# Patient Record
Sex: FEMALE | HISPANIC OR LATINO | Employment: UNEMPLOYED | URBAN - METROPOLITAN AREA
[De-identification: names, ages, dates, MRNs, and addresses within clinical notes are randomized per-mention and may not be internally consistent; named-entity substitution may affect disease eponyms.]

---

## 2017-07-11 RX ORDER — LEVOTHYROXINE SODIUM 0.05 MG/1
TABLET ORAL
Qty: 90 TAB | Refills: 0 | Status: SHIPPED | OUTPATIENT
Start: 2017-07-11 | End: 2017-11-07 | Stop reason: SDUPTHER

## 2017-11-03 ENCOUNTER — HOSPITAL ENCOUNTER (OUTPATIENT)
Dept: LAB | Facility: MEDICAL CENTER | Age: 67
End: 2017-11-03
Attending: INTERNAL MEDICINE
Payer: COMMERCIAL

## 2017-11-03 LAB
CHOLEST SERPL-MCNC: 192 MG/DL (ref 100–199)
HDLC SERPL-MCNC: 38 MG/DL
LDLC SERPL CALC-MCNC: 131 MG/DL
TRIGL SERPL-MCNC: 117 MG/DL (ref 0–149)

## 2017-11-03 PROCEDURE — 36415 COLL VENOUS BLD VENIPUNCTURE: CPT

## 2017-11-03 PROCEDURE — 80061 LIPID PANEL: CPT

## 2017-11-03 PROCEDURE — 86341 ISLET CELL ANTIBODY: CPT

## 2017-11-03 PROCEDURE — 83516 IMMUNOASSAY NONANTIBODY: CPT

## 2017-11-03 PROCEDURE — 84681 ASSAY OF C-PEPTIDE: CPT

## 2017-11-05 LAB
C PEPTIDE SERPL-MCNC: 1.8 NG/ML (ref 0.8–3.5)
PANC ISLET CELL AB TITR SER: NORMAL {TITER}

## 2017-11-07 ENCOUNTER — OFFICE VISIT (OUTPATIENT)
Dept: MEDICAL GROUP | Facility: PHYSICIAN GROUP | Age: 67
End: 2017-11-07
Payer: COMMERCIAL

## 2017-11-07 VITALS
BODY MASS INDEX: 36.52 KG/M2 | HEIGHT: 60 IN | DIASTOLIC BLOOD PRESSURE: 60 MMHG | WEIGHT: 186 LBS | HEART RATE: 73 BPM | SYSTOLIC BLOOD PRESSURE: 130 MMHG | OXYGEN SATURATION: 93 % | RESPIRATION RATE: 14 BRPM | TEMPERATURE: 98 F

## 2017-11-07 DIAGNOSIS — E66.9 OBESITY (BMI 30-39.9): ICD-10-CM

## 2017-11-07 DIAGNOSIS — E03.4 HYPOTHYROIDISM DUE TO ACQUIRED ATROPHY OF THYROID: ICD-10-CM

## 2017-11-07 LAB — GAD65 AB SER IA-ACNC: <5 IU/ML (ref 0–5)

## 2017-11-07 PROCEDURE — 99214 OFFICE O/P EST MOD 30 MIN: CPT | Performed by: FAMILY MEDICINE

## 2017-11-07 RX ORDER — LEVOTHYROXINE SODIUM 0.05 MG/1
TABLET ORAL
Qty: 90 TAB | Refills: 1 | Status: SHIPPED | OUTPATIENT
Start: 2017-11-07 | End: 2018-07-16 | Stop reason: SDUPTHER

## 2017-11-07 RX ORDER — LEVOTHYROXINE SODIUM 0.05 MG/1
TABLET ORAL
Qty: 90 TAB | Refills: 1 | Status: SHIPPED | OUTPATIENT
Start: 2017-11-07 | End: 2017-11-07 | Stop reason: SDUPTHER

## 2017-11-07 RX ORDER — LISINOPRIL 10 MG/1
10 TABLET ORAL DAILY
COMMUNITY
End: 2022-04-19

## 2017-11-07 ASSESSMENT — ENCOUNTER SYMPTOMS
CONSTITUTIONAL NEGATIVE: 1
PALPITATIONS: 0
GASTROINTESTINAL NEGATIVE: 1
EYES NEGATIVE: 1
HEADACHES: 0
CHILLS: 0
MUSCULOSKELETAL NEGATIVE: 1
DIZZINESS: 0
CARDIOVASCULAR NEGATIVE: 1
CONSTIPATION: 0
RESPIRATORY NEGATIVE: 1
NEUROLOGICAL NEGATIVE: 1
FEVER: 0
PSYCHIATRIC NEGATIVE: 1
NECK PAIN: 0
COUGH: 0
HEMOPTYSIS: 0
MYALGIAS: 0

## 2017-11-07 ASSESSMENT — PATIENT HEALTH QUESTIONNAIRE - PHQ9: CLINICAL INTERPRETATION OF PHQ2 SCORE: 0

## 2017-11-07 NOTE — PROGRESS NOTES
Subjective:      Marilou Funes is a 67 y.o. female who presents with Elevated Glucose            1. IDDM (insulin dependent diabetes mellitus) (CMS-[facility])  Normal bs last year but last month found to have increased thirst and went to er and bs of 700 and a1c of 9.8  Neg labs for anti islet cells so not type I  , bs today much better at 230  On low doses of nph and regular insulin  Will have her write down bs tid for 6 weeks and then bring them in so we can adjust insulin dose    2. Obesity (BMI 30-39.9)    - lisinopril (PRINIVIL) 10 MG Tab; Take 10 mg by mouth every day.  - Patient identified as having weight management issue.  Appropriate orders and counseling given.    3. Hypothyroidism due to acquired atrophy of thyroid  Patient is being treated for hypothyroidism, currently taking meds, no symptoms of fast or slow heartbeat and energy level steady. No new hair loss or skin symptoms. Labs have been checked in past year and are stable on current dose.  controlled      Past Medical History:  No date: Hypothyroid  History reviewed. No pertinent surgical history.  Smoking status: Never Smoker                                                              Smokeless tobacco: Never Used                      Alcohol use: No              History reviewed.  No pertinent family history.      Current Outpatient Prescriptions: •  lisinopril (PRINIVIL) 10 MG Tab, Take 10 mg by mouth every day., Disp: , Rfl:  •  levothyroxine (SYNTHROID) 50 MCG Tab, TAKE ONE TABLET BY MOUTH IN THE MORNING ON AN EMPTY STOMACH, Disp: 90 Tab, Rfl: 1•  cholecalciferol ([employer] VIT D 5000 HIGH-POTENCY) 5000 UNIT CAPS, Take 1 Cap by mouth every day., Disp: 30 Cap, Rfl: 3•  etodolac (LODINE) 400 MG tablet, Take 1 Tab by mouth 2 times a day. (Patient not taking: Reported on 11/7/2017), Disp: 60 Tab, Rfl: 3•  levothyroxine (SYNTHROID) 25 MCG TABS, Take 1 Tab by mouth Every morning on an empty stomach. (Patient not taking: Reported on  11/7/2017), Disp: 30 Tab, Rfl: 3•  Levothyroxine Sodium (SYNTHROID PO), Take  by mouth. Indications: Pt unsure of dosage, Disp: , Rfl: •  ondansetron (ZOFRAN ODT) 4 MG TBDP, Take 1 Tab by mouth every 8 hours as needed for Nausea/Vomiting. (Patient not taking: Reported on 11/7/2017), Disp: 10 Tab, Rfl: 0    Patient was instructed on the use of medications, either prescriptions or OTC and informed on when the appropriate follow up time period should be. In addition, patient was also instructed that should any acute worsening occur that they should notify this clinic asap or call 911.            Review of Systems   Constitutional: Negative.  Negative for chills and fever.        Past Medical History:  No date: Hypothyroid  History reviewed. No pertinent surgical history.  Smoking status: Never Smoker                                                              Smokeless tobacco: Never Used                      Alcohol use: No              History reviewed.  No pertinent family history.     HENT: Negative.    Eyes: Negative.    Respiratory: Negative.  Negative for cough and hemoptysis.    Cardiovascular: Negative.  Negative for chest pain and palpitations.   Gastrointestinal: Negative.  Negative for constipation.   Genitourinary: Negative.  Negative for dysuria and urgency.   Musculoskeletal: Negative.  Negative for myalgias and neck pain.   Skin: Negative.  Negative for rash.   Neurological: Negative.  Negative for dizziness and headaches.   Endo/Heme/Allergies: Negative.    Psychiatric/Behavioral: Negative.  Negative for suicidal ideas.          Objective:     /60   Pulse 73   Temp 36.7 °C (98 °F)   Resp 14   Ht 1.524 m (5')   Wt 84.4 kg (186 lb)   SpO2 93%   BMI 36.33 kg/m²      Physical Exam   Constitutional: She is oriented to person, place, and time. She appears well-developed and well-nourished. No distress.   HENT:   Head: Normocephalic and atraumatic.   Mouth/Throat: Oropharynx is clear and moist.  No oropharyngeal exudate.   Eyes: Pupils are equal, round, and reactive to light.   Cardiovascular: Normal rate, regular rhythm, normal heart sounds and intact distal pulses.  Exam reveals no gallop and no friction rub.    No murmur heard.  Pulmonary/Chest: Effort normal and breath sounds normal. No respiratory distress. She has no wheezes. She has no rales. She exhibits no tenderness.   Neurological: She is alert and oriented to person, place, and time.   Skin: She is not diaphoretic.   Psychiatric: She has a normal mood and affect. Her behavior is normal. Judgment and thought content normal.   Nursing note and vitals reviewed.              Assessment/Plan:     1. IDDM (insulin dependent diabetes mellitus) (CMS-[facility])      2. Obesity (BMI 30-39.9)    - lisinopril (PRINIVIL) 10 MG Tab; Take 10 mg by mouth every day.  - Patient identified as having weight management issue.  Appropriate orders and counseling given.    3. Hypothyroidism due to acquired atrophy of thyroid

## 2017-12-19 ENCOUNTER — OFFICE VISIT (OUTPATIENT)
Dept: MEDICAL GROUP | Facility: PHYSICIAN GROUP | Age: 67
End: 2017-12-19
Payer: COMMERCIAL

## 2017-12-19 VITALS
WEIGHT: 187 LBS | RESPIRATION RATE: 16 BRPM | BODY MASS INDEX: 36.71 KG/M2 | HEART RATE: 75 BPM | HEIGHT: 60 IN | DIASTOLIC BLOOD PRESSURE: 88 MMHG | OXYGEN SATURATION: 96 % | SYSTOLIC BLOOD PRESSURE: 138 MMHG | TEMPERATURE: 97.9 F

## 2017-12-19 DIAGNOSIS — Z12.31 ENCOUNTER FOR SCREENING MAMMOGRAM FOR HIGH-RISK PATIENT: ICD-10-CM

## 2017-12-19 DIAGNOSIS — E03.4 HYPOTHYROIDISM DUE TO ACQUIRED ATROPHY OF THYROID: ICD-10-CM

## 2017-12-19 DIAGNOSIS — E55.9 VITAMIN D DEFICIENCY: ICD-10-CM

## 2017-12-19 PROCEDURE — 99214 OFFICE O/P EST MOD 30 MIN: CPT | Performed by: FAMILY MEDICINE

## 2017-12-19 ASSESSMENT — ENCOUNTER SYMPTOMS
DIZZINESS: 0
NEUROLOGICAL NEGATIVE: 1
GASTROINTESTINAL NEGATIVE: 1
MUSCULOSKELETAL NEGATIVE: 1
COUGH: 0
FEVER: 0
HEADACHES: 0
RESPIRATORY NEGATIVE: 1
EYES NEGATIVE: 1
CARDIOVASCULAR NEGATIVE: 1
CONSTITUTIONAL NEGATIVE: 1
PSYCHIATRIC NEGATIVE: 1
CONSTIPATION: 0
MYALGIAS: 0
PALPITATIONS: 0
NECK PAIN: 0
HEMOPTYSIS: 0
CHILLS: 0

## 2017-12-19 NOTE — PROGRESS NOTES
Subjective:      Marilou Funes is a 67 y.o. female who presents with Blood Sugar Problem (F/V)            1. IDDM (insulin dependent diabetes mellitus) (CMS-HCC)  a1c of 7.2  Taking 10 units of lantus qhs and 5 units tid of regular  Having some sx of lows in the mornings  Will decrease down to 5 units of lantus and then f/u in 3 months with full labs  - COMP METABOLIC PANEL; Future  - HEMOGLOBIN A1C; Future  - MICROALBUMIN CREAT RATIO URINE (LAB COLLECT); Future    2. Vitamin D deficiency  Patient is currently treated for vitamin D deficiency with supplemental vitamin d, increase sunlight and dairy products. No current issues with treatment  controlled      3. Hypothyroidism due to acquired atrophy of thyroid  Patient is being treated for hypothyroidism, currently taking meds, no symptoms of fast or slow heartbeat and energy level steady. No new hair loss or skin symptoms. Labs have been checked in past year and are stable on current dose.  controlled      4. Encounter for screening mammogram for high-risk patient    - MA-SCREEN MAMMO W/CAD-BILAT; Future    Past Medical History:  No date: Hypothyroid  History reviewed. No pertinent surgical history.  Smoking status: Never Smoker                                                              Smokeless tobacco: Never Used                      Alcohol use: No              History reviewed.  No pertinent family history.      Current Outpatient Prescriptions: •  lisinopril (PRINIVIL) 10 MG Tab, Take 10 mg by mouth every day., Disp: , Rfl:  •  levothyroxine (SYNTHROID) 50 MCG Tab, TAKE ONE TABLET BY MOUTH IN THE MORNING ON AN EMPTY STOMACH, Disp: 90 Tab, Rfl: 1•  cholecalciferol ([employer]S VIT D 5000 HIGH-POTENCY) 5000 UNIT CAPS, Take 1 Cap by mouth every day., Disp: 30 Cap, Rfl: 3•  etodolac (LODINE) 400 MG tablet, Take 1 Tab by mouth 2 times a day., Disp: 60 Tab, Rfl: 3•  levothyroxine (SYNTHROID) 25 MCG TABS, Take 1 Tab by mouth Every morning on an empty  stomach., Disp: 30 Tab, Rfl: 3•  Levothyroxine Sodium (SYNTHROID PO), Take  by mouth. Indications: Pt unsure of dosage, Disp: , Rfl: •  ondansetron (ZOFRAN ODT) 4 MG TBDP, Take 1 Tab by mouth every 8 hours as needed for Nausea/Vomiting., Disp: 10 Tab, Rfl: 0    Patient was instructed on the use of medications, either prescriptions or OTC and informed on when the appropriate follow up time period should be. In addition, patient was also instructed that should any acute worsening occur that they should notify this clinic asap or call 911.            Review of Systems   Constitutional: Negative.  Negative for chills and fever.        Past Medical History:  No date: Hypothyroid  History reviewed. No pertinent surgical history.  Smoking status: Never Smoker                                                              Smokeless tobacco: Never Used                      Alcohol use: No              History reviewed.  No pertinent family history.     HENT: Negative.    Eyes: Negative.    Respiratory: Negative.  Negative for cough and hemoptysis.    Cardiovascular: Negative.  Negative for chest pain and palpitations.   Gastrointestinal: Negative.  Negative for constipation.   Genitourinary: Negative.  Negative for dysuria and urgency.   Musculoskeletal: Negative.  Negative for myalgias and neck pain.   Skin: Negative.  Negative for rash.   Neurological: Negative.  Negative for dizziness and headaches.   Endo/Heme/Allergies: Negative.    Psychiatric/Behavioral: Negative.  Negative for suicidal ideas.          Objective:     /88   Pulse 75   Temp 36.6 °C (97.9 °F)   Resp 16   Ht 1.524 m (5')   Wt 84.8 kg (187 lb)   SpO2 96%   BMI 36.52 kg/m²      Physical Exam   Constitutional: She is oriented to person, place, and time. She appears well-developed and well-nourished. No distress.   HENT:   Head: Normocephalic and atraumatic.   Mouth/Throat: Oropharynx is clear and moist. No oropharyngeal exudate.   Eyes: Pupils are  equal, round, and reactive to light.   Cardiovascular: Normal rate, regular rhythm, normal heart sounds and intact distal pulses.  Exam reveals no gallop and no friction rub.    No murmur heard.  Pulmonary/Chest: Effort normal and breath sounds normal. No respiratory distress. She has no wheezes. She has no rales. She exhibits no tenderness.   Neurological: She is alert and oriented to person, place, and time.   Skin: She is not diaphoretic.   Psychiatric: She has a normal mood and affect. Her behavior is normal. Judgment and thought content normal.   Nursing note and vitals reviewed.              Assessment/Plan:     1. IDDM (insulin dependent diabetes mellitus) (CMS-[facility])    - COMP METABOLIC PANEL; Future  - HEMOGLOBIN A1C; Future  - MICROALBUMIN CREAT RATIO URINE (LAB COLLECT); Future    2. Vitamin D deficiency      3. Hypothyroidism due to acquired atrophy of thyroid      4. Encounter for screening mammogram for high-risk patient    - MA-SCREEN MAMMO W/CAD-BILAT; Future

## 2018-06-14 DIAGNOSIS — E03.9 ACQUIRED HYPOTHYROIDISM: ICD-10-CM

## 2018-06-18 RX ORDER — LEVOTHYROXINE SODIUM 0.05 MG/1
TABLET ORAL
Qty: 90 TAB | Refills: 1 | Status: SHIPPED | OUTPATIENT
Start: 2018-06-18 | End: 2019-07-30 | Stop reason: SDUPTHER

## 2018-07-16 RX ORDER — LEVOTHYROXINE SODIUM 0.05 MG/1
TABLET ORAL
Qty: 90 TAB | Refills: 1 | Status: SHIPPED | OUTPATIENT
Start: 2018-07-16 | End: 2019-03-22 | Stop reason: SDUPTHER

## 2019-03-21 ENCOUNTER — TELEPHONE (OUTPATIENT)
Dept: MEDICAL GROUP | Facility: PHYSICIAN GROUP | Age: 69
End: 2019-03-21

## 2019-03-21 NOTE — TELEPHONE ENCOUNTER
Patients daughter calloed in requesting a refill of her levothyroxine. Looking at her chart, it has been over a year since she has been in for a follow up. She was advised to schedule an appointment and to call back after so a request for a short supply can be requested until she is seen.

## 2019-03-22 ENCOUNTER — OFFICE VISIT (OUTPATIENT)
Dept: MEDICAL GROUP | Facility: PHYSICIAN GROUP | Age: 69
End: 2019-03-22
Payer: COMMERCIAL

## 2019-03-22 VITALS
DIASTOLIC BLOOD PRESSURE: 78 MMHG | WEIGHT: 194 LBS | BODY MASS INDEX: 35.7 KG/M2 | OXYGEN SATURATION: 94 % | RESPIRATION RATE: 12 BRPM | HEART RATE: 61 BPM | SYSTOLIC BLOOD PRESSURE: 144 MMHG | TEMPERATURE: 97.5 F | HEIGHT: 62 IN

## 2019-03-22 DIAGNOSIS — E03.9 HYPOTHYROIDISM, UNSPECIFIED TYPE: ICD-10-CM

## 2019-03-22 DIAGNOSIS — E66.9 OBESITY (BMI 35.0-39.9 WITHOUT COMORBIDITY): ICD-10-CM

## 2019-03-22 LAB
HBA1C MFR BLD: 6 % (ref ?–5.8)
INT CON NEG: NEGATIVE
INT CON POS: POSITIVE

## 2019-03-22 PROCEDURE — 99214 OFFICE O/P EST MOD 30 MIN: CPT | Performed by: FAMILY MEDICINE

## 2019-03-22 PROCEDURE — 83036 HEMOGLOBIN GLYCOSYLATED A1C: CPT | Performed by: FAMILY MEDICINE

## 2019-03-22 RX ORDER — LEVOTHYROXINE SODIUM 0.05 MG/1
TABLET ORAL
Qty: 90 TAB | Refills: 1 | Status: SHIPPED | OUTPATIENT
Start: 2019-03-22 | End: 2019-07-26 | Stop reason: SDUPTHER

## 2019-03-22 ASSESSMENT — ENCOUNTER SYMPTOMS
MYALGIAS: 0
BLURRED VISION: 0
NAUSEA: 0
HEMOPTYSIS: 0
EYES NEGATIVE: 1
CHILLS: 0
TINGLING: 0
COUGH: 0
PALPITATIONS: 0
GASTROINTESTINAL NEGATIVE: 1
HEADACHES: 0
DIZZINESS: 0
DOUBLE VISION: 0
DEPRESSION: 0
PSYCHIATRIC NEGATIVE: 1
BRUISES/BLEEDS EASILY: 0
MUSCULOSKELETAL NEGATIVE: 1
CARDIOVASCULAR NEGATIVE: 1
RESPIRATORY NEGATIVE: 1
HEARTBURN: 0
CONSTITUTIONAL NEGATIVE: 1
NEUROLOGICAL NEGATIVE: 1
FEVER: 0

## 2019-07-19 ENCOUNTER — HOSPITAL ENCOUNTER (OUTPATIENT)
Dept: LAB | Facility: MEDICAL CENTER | Age: 69
End: 2019-07-19
Attending: FAMILY MEDICINE
Payer: COMMERCIAL

## 2019-07-19 DIAGNOSIS — E03.9 HYPOTHYROIDISM, UNSPECIFIED TYPE: ICD-10-CM

## 2019-07-19 DIAGNOSIS — E66.9 OBESITY (BMI 35.0-39.9 WITHOUT COMORBIDITY): ICD-10-CM

## 2019-07-19 LAB
ALBUMIN SERPL BCP-MCNC: 4 G/DL (ref 3.2–4.9)
ALBUMIN/GLOB SERPL: 1.1 G/DL
ALP SERPL-CCNC: 82 U/L (ref 30–99)
ALT SERPL-CCNC: 88 U/L (ref 2–50)
ANION GAP SERPL CALC-SCNC: 8 MMOL/L (ref 0–11.9)
AST SERPL-CCNC: 49 U/L (ref 12–45)
BILIRUB SERPL-MCNC: 0.4 MG/DL (ref 0.1–1.5)
BUN SERPL-MCNC: 10 MG/DL (ref 8–22)
CALCIUM SERPL-MCNC: 9.2 MG/DL (ref 8.5–10.5)
CHLORIDE SERPL-SCNC: 105 MMOL/L (ref 96–112)
CHOLEST SERPL-MCNC: 230 MG/DL (ref 100–199)
CO2 SERPL-SCNC: 27 MMOL/L (ref 20–33)
CREAT SERPL-MCNC: 0.66 MG/DL (ref 0.5–1.4)
GLOBULIN SER CALC-MCNC: 3.5 G/DL (ref 1.9–3.5)
GLUCOSE SERPL-MCNC: 114 MG/DL (ref 65–99)
HDLC SERPL-MCNC: 41 MG/DL
LDLC SERPL CALC-MCNC: 159 MG/DL
POTASSIUM SERPL-SCNC: 4.3 MMOL/L (ref 3.6–5.5)
PROT SERPL-MCNC: 7.5 G/DL (ref 6–8.2)
SODIUM SERPL-SCNC: 140 MMOL/L (ref 135–145)
T3 SERPL-MCNC: 120.9 NG/DL (ref 60–181)
T4 FREE SERPL-MCNC: 0.7 NG/DL (ref 0.53–1.43)
TRIGL SERPL-MCNC: 152 MG/DL (ref 0–149)
TSH SERPL DL<=0.005 MIU/L-ACNC: 7.34 UIU/ML (ref 0.38–5.33)

## 2019-07-19 PROCEDURE — 82043 UR ALBUMIN QUANTITATIVE: CPT

## 2019-07-19 PROCEDURE — 84480 ASSAY TRIIODOTHYRONINE (T3): CPT

## 2019-07-19 PROCEDURE — 84443 ASSAY THYROID STIM HORMONE: CPT

## 2019-07-19 PROCEDURE — 80053 COMPREHEN METABOLIC PANEL: CPT

## 2019-07-19 PROCEDURE — 80061 LIPID PANEL: CPT

## 2019-07-19 PROCEDURE — 83036 HEMOGLOBIN GLYCOSYLATED A1C: CPT

## 2019-07-19 PROCEDURE — 84439 ASSAY OF FREE THYROXINE: CPT

## 2019-07-19 PROCEDURE — 36415 COLL VENOUS BLD VENIPUNCTURE: CPT

## 2019-07-19 PROCEDURE — 82570 ASSAY OF URINE CREATININE: CPT

## 2019-07-20 LAB
CREAT UR-MCNC: 64.1 MG/DL
EST. AVERAGE GLUCOSE BLD GHB EST-MCNC: 143 MG/DL
HBA1C MFR BLD: 6.6 % (ref 0–5.6)
MICROALBUMIN UR-MCNC: <0.7 MG/DL
MICROALBUMIN/CREAT UR: NORMAL MG/G (ref 0–30)

## 2019-07-26 DIAGNOSIS — E66.9 OBESITY (BMI 35.0-39.9 WITHOUT COMORBIDITY): ICD-10-CM

## 2019-07-26 DIAGNOSIS — E03.9 HYPOTHYROIDISM, UNSPECIFIED TYPE: ICD-10-CM

## 2019-07-26 RX ORDER — LEVOTHYROXINE SODIUM 0.05 MG/1
TABLET ORAL
Qty: 90 TAB | Refills: 1 | Status: SHIPPED | OUTPATIENT
Start: 2019-07-26 | End: 2020-03-27 | Stop reason: SDUPTHER

## 2019-07-26 NOTE — TELEPHONE ENCOUNTER
Was the patient seen in the last year in this department? Yes    Does patient have an active prescription for medications requested? Yes    Received Request Via: Pharmacy     Last visit  3-22-19    Last lab   7-19-19

## 2019-07-30 ENCOUNTER — OFFICE VISIT (OUTPATIENT)
Dept: MEDICAL GROUP | Facility: PHYSICIAN GROUP | Age: 69
End: 2019-07-30
Payer: COMMERCIAL

## 2019-07-30 VITALS
WEIGHT: 195.4 LBS | BODY MASS INDEX: 35.96 KG/M2 | OXYGEN SATURATION: 94 % | SYSTOLIC BLOOD PRESSURE: 136 MMHG | DIASTOLIC BLOOD PRESSURE: 74 MMHG | HEIGHT: 62 IN | HEART RATE: 70 BPM | TEMPERATURE: 97.6 F | RESPIRATION RATE: 14 BRPM

## 2019-07-30 DIAGNOSIS — E03.9 ACQUIRED HYPOTHYROIDISM: ICD-10-CM

## 2019-07-30 DIAGNOSIS — E66.9 OBESITY (BMI 35.0-39.9 WITHOUT COMORBIDITY): ICD-10-CM

## 2019-07-30 DIAGNOSIS — E74.39 GLUCOSE INTOLERANCE: ICD-10-CM

## 2019-07-30 DIAGNOSIS — E03.4 HYPOTHYROIDISM DUE TO ACQUIRED ATROPHY OF THYROID: ICD-10-CM

## 2019-07-30 DIAGNOSIS — E78.2 MIXED HYPERLIPIDEMIA: ICD-10-CM

## 2019-07-30 PROCEDURE — 99214 OFFICE O/P EST MOD 30 MIN: CPT | Performed by: FAMILY MEDICINE

## 2019-07-30 RX ORDER — LOVASTATIN 10 MG/1
10 TABLET ORAL DAILY
Qty: 90 TAB | Refills: 1 | Status: SHIPPED | OUTPATIENT
Start: 2019-07-30 | End: 2022-04-19

## 2019-07-30 RX ORDER — LEVOTHYROXINE SODIUM 0.05 MG/1
TABLET ORAL
Qty: 90 TAB | Refills: 1 | Status: SHIPPED | OUTPATIENT
Start: 2019-07-30 | End: 2020-06-18 | Stop reason: SDUPTHER

## 2019-07-30 ASSESSMENT — ENCOUNTER SYMPTOMS
TINGLING: 0
EYES NEGATIVE: 1
HEARTBURN: 0
MUSCULOSKELETAL NEGATIVE: 1
MYALGIAS: 0
BRUISES/BLEEDS EASILY: 0
FEVER: 0
NAUSEA: 0
HEMOPTYSIS: 0
CHILLS: 0
NEUROLOGICAL NEGATIVE: 1
RESPIRATORY NEGATIVE: 1
BLURRED VISION: 0
DIZZINESS: 0
HEADACHES: 0
COUGH: 0
PSYCHIATRIC NEGATIVE: 1
GASTROINTESTINAL NEGATIVE: 1
DEPRESSION: 0
PALPITATIONS: 0
CARDIOVASCULAR NEGATIVE: 1
CONSTITUTIONAL NEGATIVE: 1
DOUBLE VISION: 0

## 2019-07-31 NOTE — PROGRESS NOTES
Subjective:      Marilou Funes is a 69 y.o. female who presents with Results (lab )            1. Hypothyroidism due to acquired atrophy of thyroid  Patient is being treated for hypothyroidism, currently taking meds, no symptoms of fast or slow heartbeat and energy level steady. No new hair loss or skin symptoms. Labs have been checked in past year and are stable on current dose.  controlled    - levothyroxine (SYNTHROID) 50 MCG Tab; TAKE ONE TABLET BY MOUTH ONCE DAILY IN THE MORNING ON EMPTY STOMACH  Dispense: 90 Tab; Refill: 1  - lovastatin (MEVACOR) 10 MG tablet; Take 1 Tab by mouth every day.  Dispense: 90 Tab; Refill: 1  - Comp Metabolic Panel; Future  - FREE THYROXINE; Future  - TRIIDOTHYRONINE; Future  - TSH; Future  - HEMOGLOBIN A1C; Future  - Lipid Profile; Future    2. Obesity (BMI 35.0-39.9 without comorbidity) (HCC)    - levothyroxine (SYNTHROID) 50 MCG Tab; TAKE ONE TABLET BY MOUTH ONCE DAILY IN THE MORNING ON EMPTY STOMACH  Dispense: 90 Tab; Refill: 1  - lovastatin (MEVACOR) 10 MG tablet; Take 1 Tab by mouth every day.  Dispense: 90 Tab; Refill: 1  - Comp Metabolic Panel; Future  - FREE THYROXINE; Future  - TRIIDOTHYRONINE; Future  - TSH; Future  - HEMOGLOBIN A1C; Future  - Lipid Profile; Future    3. Glucose intolerance  a1c 6.3  On diet only and doing ok will continue  Due to the patient's issues with glucose management, today they were extensively counseled on a low carb diet and exercise and losing weight    - levothyroxine (SYNTHROID) 50 MCG Tab; TAKE ONE TABLET BY MOUTH ONCE DAILY IN THE MORNING ON EMPTY STOMACH  Dispense: 90 Tab; Refill: 1  - lovastatin (MEVACOR) 10 MG tablet; Take 1 Tab by mouth every day.  Dispense: 90 Tab; Refill: 1  - Comp Metabolic Panel; Future  - FREE THYROXINE; Future  - TRIIDOTHYRONINE; Future  - TSH; Future  - HEMOGLOBIN A1C; Future  - Lipid Profile; Future    4. Acquired hypothyroidism  Patient is being treated for hypothyroidism, currently  taking meds, no symptoms of fast or slow heartbeat and energy level steady. No new hair loss or skin symptoms. Labs have been checked in past year and are stable on current dose.  controlled    - levothyroxine (SYNTHROID) 50 MCG Tab; TAKE ONE TABLET BY MOUTH ONCE DAILY IN THE MORNING ON EMPTY STOMACH  Dispense: 90 Tab; Refill: 1  - lovastatin (MEVACOR) 10 MG tablet; Take 1 Tab by mouth every day.  Dispense: 90 Tab; Refill: 1  - Comp Metabolic Panel; Future  - FREE THYROXINE; Future  - TRIIDOTHYRONINE; Future  - TSH; Future  - HEMOGLOBIN A1C; Future  - Lipid Profile; Future    5. Mixed hyperlipidemia  ldl too high will start low dose mevacor  - levothyroxine (SYNTHROID) 50 MCG Tab; TAKE ONE TABLET BY MOUTH ONCE DAILY IN THE MORNING ON EMPTY STOMACH  Dispense: 90 Tab; Refill: 1  - lovastatin (MEVACOR) 10 MG tablet; Take 1 Tab by mouth every day.  Dispense: 90 Tab; Refill: 1  - Comp Metabolic Panel; Future  - FREE THYROXINE; Future  - TRIIDOTHYRONINE; Future  - TSH; Future  - HEMOGLOBIN A1C; Future  - Lipid Profile; Future    Past Medical History:  No date: Hypothyroid  No past surgical history on file.  Smoking status: Never Smoker                                                              Smokeless tobacco: Never Used                      Alcohol use: No              No family history on file.      Current Outpatient Prescriptions: •  levothyroxine (SYNTHROID) 50 MCG Tab, TAKE ONE TABLET BY MOUTH ONCE DAILY IN THE MORNING ON EMPTY STOMACH, Disp: 90 Tab, Rfl: 1•  lovastatin (MEVACOR) 10 MG tablet, Take 1 Tab by mouth every day., Disp: 90 Tab, Rfl: 1•  levothyroxine (SYNTHROID) 50 MCG Tab, TAKE ONE TABLET BY MOUTH IN THE MORNING ON AN EMPTY STOMACH (Patient not taking: Reported on 7/30/2019), Disp: 90 Tab, Rfl: 1•  lisinopril (PRINIVIL) 10 MG Tab, Take 10 mg by mouth every day., Disp: , Rfl:  •  cholecalciferol ([employer]S VIT D 5000 HIGH-POTENCY) 5000 UNIT CAPS, Take 1 Cap by mouth every day. (Patient not taking: Reported  "on 7/30/2019), Disp: 30 Cap, Rfl: 3•  etodolac (LODINE) 400 MG tablet, Take 1 Tab by mouth 2 times a day. (Patient not taking: Reported on 7/30/2019), Disp: 60 Tab, Rfl: 3•  levothyroxine (SYNTHROID) 25 MCG TABS, Take 1 Tab by mouth Every morning on an empty stomach. (Patient not taking: Reported on 7/30/2019), Disp: 30 Tab, Rfl: 3•  Levothyroxine Sodium (SYNTHROID PO), Take  by mouth. Indications: Pt unsure of dosage, Disp: , Rfl: •  ondansetron (ZOFRAN ODT) 4 MG TBDP, Take 1 Tab by mouth every 8 hours as needed for Nausea/Vomiting. (Patient not taking: Reported on 7/30/2019), Disp: 10 Tab, Rfl: 0    Patient was instructed on the use of medications, either prescriptions or OTC and informed on when the appropriate follow up time period should be. In addition, patient was also instructed that should any acute worsening occur that they should notify this clinic asap or call 911.            Review of Systems   Constitutional: Negative.  Negative for chills and fever.   HENT: Negative.  Negative for hearing loss.    Eyes: Negative.  Negative for blurred vision and double vision.   Respiratory: Negative.  Negative for cough and hemoptysis.    Cardiovascular: Negative.  Negative for chest pain and palpitations.   Gastrointestinal: Negative.  Negative for heartburn and nausea.   Genitourinary: Negative.  Negative for dysuria.   Musculoskeletal: Negative.  Negative for myalgias.   Skin: Negative.  Negative for rash.   Neurological: Negative.  Negative for dizziness, tingling and headaches.   Endo/Heme/Allergies: Negative.  Does not bruise/bleed easily.   Psychiatric/Behavioral: Negative.  Negative for depression and suicidal ideas.   All other systems reviewed and are negative.         Objective:     /74 (BP Location: Right arm, Patient Position: Sitting, BP Cuff Size: Adult)   Pulse 70   Temp 36.4 °C (97.6 °F)   Resp 14   Ht 1.575 m (5' 2\")   Wt 88.6 kg (195 lb 6.4 oz)   SpO2 94%   BMI 35.74 kg/m²  "     Physical Exam   Constitutional: She is oriented to person, place, and time. She appears well-developed and well-nourished. No distress.   HENT:   Head: Normocephalic and atraumatic.   Mouth/Throat: Oropharynx is clear and moist. No oropharyngeal exudate.   Eyes: Pupils are equal, round, and reactive to light.   Cardiovascular: Normal rate, regular rhythm, normal heart sounds and intact distal pulses.  Exam reveals no gallop and no friction rub.    No murmur heard.  Pulmonary/Chest: Effort normal and breath sounds normal. No respiratory distress. She has no wheezes. She has no rales. She exhibits no tenderness.   Neurological: She is alert and oriented to person, place, and time.   Skin: She is not diaphoretic.   Psychiatric: She has a normal mood and affect. Her behavior is normal. Judgment and thought content normal.   Nursing note and vitals reviewed.              Assessment/Plan:     1. Hypothyroidism due to acquired atrophy of thyroid    - levothyroxine (SYNTHROID) 50 MCG Tab; TAKE ONE TABLET BY MOUTH ONCE DAILY IN THE MORNING ON EMPTY STOMACH  Dispense: 90 Tab; Refill: 1  - lovastatin (MEVACOR) 10 MG tablet; Take 1 Tab by mouth every day.  Dispense: 90 Tab; Refill: 1  - Comp Metabolic Panel; Future  - FREE THYROXINE; Future  - TRIIDOTHYRONINE; Future  - TSH; Future  - HEMOGLOBIN A1C; Future  - Lipid Profile; Future    2. Obesity (BMI 35.0-39.9 without comorbidity) ([facility])    - levothyroxine (SYNTHROID) 50 MCG Tab; TAKE ONE TABLET BY MOUTH ONCE DAILY IN THE MORNING ON EMPTY STOMACH  Dispense: 90 Tab; Refill: 1  - lovastatin (MEVACOR) 10 MG tablet; Take 1 Tab by mouth every day.  Dispense: 90 Tab; Refill: 1  - Comp Metabolic Panel; Future  - FREE THYROXINE; Future  - TRIIDOTHYRONINE; Future  - TSH; Future  - HEMOGLOBIN A1C; Future  - Lipid Profile; Future    3. Glucose intolerance    - levothyroxine (SYNTHROID) 50 MCG Tab; TAKE ONE TABLET BY MOUTH ONCE DAILY IN THE MORNING ON EMPTY STOMACH  Dispense: 90 Tab;  Refill: 1  - lovastatin (MEVACOR) 10 MG tablet; Take 1 Tab by mouth every day.  Dispense: 90 Tab; Refill: 1  - Comp Metabolic Panel; Future  - FREE THYROXINE; Future  - TRIIDOTHYRONINE; Future  - TSH; Future  - HEMOGLOBIN A1C; Future  - Lipid Profile; Future    4. Acquired hypothyroidism    - levothyroxine (SYNTHROID) 50 MCG Tab; TAKE ONE TABLET BY MOUTH ONCE DAILY IN THE MORNING ON EMPTY STOMACH  Dispense: 90 Tab; Refill: 1  - lovastatin (MEVACOR) 10 MG tablet; Take 1 Tab by mouth every day.  Dispense: 90 Tab; Refill: 1  - Comp Metabolic Panel; Future  - FREE THYROXINE; Future  - TRIIDOTHYRONINE; Future  - TSH; Future  - HEMOGLOBIN A1C; Future  - Lipid Profile; Future    5. Mixed hyperlipidemia  - levothyroxine (SYNTHROID) 50 MCG Tab; TAKE ONE TABLET BY MOUTH ONCE DAILY IN THE MORNING ON EMPTY STOMACH  Dispense: 90 Tab; Refill: 1  - lovastatin (MEVACOR) 10 MG tablet; Take 1 Tab by mouth every day.  Dispense: 90 Tab; Refill: 1  - Comp Metabolic Panel; Future  - FREE THYROXINE; Future  - TRIIDOTHYRONINE; Future  - TSH; Future  - HEMOGLOBIN A1C; Future  - Lipid Profile; Future

## 2019-10-14 ENCOUNTER — OFFICE VISIT (OUTPATIENT)
Dept: MEDICAL GROUP | Facility: PHYSICIAN GROUP | Age: 69
End: 2019-10-14
Payer: COMMERCIAL

## 2019-10-14 VITALS
WEIGHT: 195.5 LBS | OXYGEN SATURATION: 95 % | TEMPERATURE: 98.2 F | SYSTOLIC BLOOD PRESSURE: 154 MMHG | BODY MASS INDEX: 38.38 KG/M2 | RESPIRATION RATE: 20 BRPM | HEIGHT: 60 IN | HEART RATE: 60 BPM | DIASTOLIC BLOOD PRESSURE: 74 MMHG

## 2019-10-14 DIAGNOSIS — E03.9 ACQUIRED HYPOTHYROIDISM: ICD-10-CM

## 2019-10-14 DIAGNOSIS — E66.9 OBESITY (BMI 35.0-39.9 WITHOUT COMORBIDITY): ICD-10-CM

## 2019-10-14 DIAGNOSIS — E74.39 GLUCOSE INTOLERANCE: ICD-10-CM

## 2019-10-14 DIAGNOSIS — N32.89 BLADDER INSTABILITY: ICD-10-CM

## 2019-10-14 LAB
APPEARANCE UR: CLEAR
BILIRUB UR STRIP-MCNC: NORMAL MG/DL
COLOR UR AUTO: YELLOW
GLUCOSE UR STRIP.AUTO-MCNC: NORMAL MG/DL
KETONES UR STRIP.AUTO-MCNC: NORMAL MG/DL
LEUKOCYTE ESTERASE UR QL STRIP.AUTO: NORMAL
NITRITE UR QL STRIP.AUTO: NORMAL
PH UR STRIP.AUTO: 6 [PH] (ref 5–8)
PROT UR QL STRIP: NORMAL MG/DL
RBC UR QL AUTO: NORMAL
SP GR UR STRIP.AUTO: 1.03
UROBILINOGEN UR STRIP-MCNC: 0.2 MG/DL

## 2019-10-14 PROCEDURE — 81002 URINALYSIS NONAUTO W/O SCOPE: CPT | Performed by: FAMILY MEDICINE

## 2019-10-14 PROCEDURE — 99214 OFFICE O/P EST MOD 30 MIN: CPT | Performed by: FAMILY MEDICINE

## 2019-10-14 RX ORDER — OXYBUTYNIN CHLORIDE 5 MG/1
5 TABLET, EXTENDED RELEASE ORAL DAILY
Qty: 30 TAB | Refills: 3 | Status: SHIPPED | OUTPATIENT
Start: 2019-10-14 | End: 2022-04-19

## 2019-10-14 ASSESSMENT — PATIENT HEALTH QUESTIONNAIRE - PHQ9: CLINICAL INTERPRETATION OF PHQ2 SCORE: 0

## 2019-10-14 ASSESSMENT — ENCOUNTER SYMPTOMS
BRUISES/BLEEDS EASILY: 0
DIZZINESS: 0
GASTROINTESTINAL NEGATIVE: 1
CARDIOVASCULAR NEGATIVE: 1
FEVER: 0
NEUROLOGICAL NEGATIVE: 1
PALPITATIONS: 0
RESPIRATORY NEGATIVE: 1
PSYCHIATRIC NEGATIVE: 1
MUSCULOSKELETAL NEGATIVE: 1
DOUBLE VISION: 0
MYALGIAS: 0
BLURRED VISION: 0
CHILLS: 0
HEADACHES: 0
TINGLING: 0
CONSTITUTIONAL NEGATIVE: 1
HEMOPTYSIS: 0
NAUSEA: 0
COUGH: 0
EYES NEGATIVE: 1
HEARTBURN: 0
DEPRESSION: 0

## 2019-10-14 NOTE — PROGRESS NOTES
Subjective:      Marilou Funes is a 69 y.o. female who presents with Other (waking up at night urinating x1 year but now is more frequent feels bladder inflamed)            1. Bladder instability  Reports some issues with going frequently and not feeling like she can get to the bathroom at times, urine clear to day will try low dose ditropan  - oxybutynin SR (DITROPAN-XL) 5 MG TABLET SR 24 HR; Take 1 Tab by mouth every day.  Dispense: 30 Tab; Refill: 3  - POCT Urinalysis    2. Glucose intolerance  a1c 6.4 2 mo ago  Due to the patient's issues with glucose management, today they were extensively counseled on a low carb diet and exercise and losing weight      3. Acquired hypothyroidism  Patient is being treated for hypothyroidism, currently taking meds, no symptoms of fast or slow heartbeat and energy level steady. No new hair loss or skin symptoms. Labs have been checked in past year and are stable on current dose.  controlled      4. Obesity (BMI 35.0-39.9 without comorbidity) (HCC)  Patient has a diagnosis of obesity. They were counseled today on increasing exercise and  extensively counseled on a low carb diet       Past Medical History:  No date: Hypothyroid  History reviewed. No pertinent surgical history.  Social History    Tobacco Use      Smoking status: Never Smoker      Smokeless tobacco: Never Used    Alcohol use: No    Drug use: No    History reviewed.  No pertinent family history.      Current Outpatient Medications: •  oxybutynin SR (DITROPAN-XL) 5 MG TABLET SR 24 HR, Take 1 Tab by mouth every day., Disp: 30 Tab, Rfl: 3•  levothyroxine (SYNTHROID) 50 MCG Tab, TAKE ONE TABLET BY MOUTH ONCE DAILY IN THE MORNING ON EMPTY STOMACH, Disp: 90 Tab, Rfl: 1•  lovastatin (MEVACOR) 10 MG tablet, Take 1 Tab by mouth every day., Disp: 90 Tab, Rfl: 1•  levothyroxine (SYNTHROID) 50 MCG Tab, TAKE ONE TABLET BY MOUTH IN THE MORNING ON AN EMPTY STOMACH (Patient not taking: Reported on 7/30/2019),  Disp: 90 Tab, Rfl: 1•  lisinopril (PRINIVIL) 10 MG Tab, Take 10 mg by mouth every day., Disp: , Rfl:  •  cholecalciferol ([employer] VIT D 5000 HIGH-POTENCY) 5000 UNIT CAPS, Take 1 Cap by mouth every day. (Patient not taking: Reported on 7/30/2019), Disp: 30 Cap, Rfl: 3•  etodolac (LODINE) 400 MG tablet, Take 1 Tab by mouth 2 times a day. (Patient not taking: Reported on 7/30/2019), Disp: 60 Tab, Rfl: 3•  levothyroxine (SYNTHROID) 25 MCG TABS, Take 1 Tab by mouth Every morning on an empty stomach. (Patient not taking: Reported on 7/30/2019), Disp: 30 Tab, Rfl: 3•  Levothyroxine Sodium (SYNTHROID PO), Take  by mouth. Indications: Pt unsure of dosage, Disp: , Rfl: •  ondansetron (ZOFRAN ODT) 4 MG TBDP, Take 1 Tab by mouth every 8 hours as needed for Nausea/Vomiting. (Patient not taking: Reported on 7/30/2019), Disp: 10 Tab, Rfl: 0    Patient was instructed on the use of medications, either prescriptions or OTC and informed on when the appropriate follow up time period should be. In addition, patient was also instructed that should any acute worsening occur that they should notify this clinic asap or call 911.          Review of Systems   Constitutional: Negative.  Negative for chills and fever.   HENT: Negative.  Negative for hearing loss.    Eyes: Negative.  Negative for blurred vision and double vision.   Respiratory: Negative.  Negative for cough and hemoptysis.    Cardiovascular: Negative.  Negative for chest pain and palpitations.   Gastrointestinal: Negative.  Negative for heartburn and nausea.   Genitourinary: Positive for frequency. Negative for dysuria.   Musculoskeletal: Negative.  Negative for myalgias.   Skin: Negative.  Negative for rash.   Neurological: Negative.  Negative for dizziness, tingling and headaches.   Endo/Heme/Allergies: Negative.  Does not bruise/bleed easily.   Psychiatric/Behavioral: Negative.  Negative for depression and suicidal ideas.   All other systems reviewed and are negative.          "Objective:     /74   Pulse 60   Temp 36.8 °C (98.2 °F)   Resp 20   Ht 1.525 m (5' 0.05\")   Wt 88.7 kg (195 lb 8 oz)   SpO2 95%   BMI 38.12 kg/m²      Physical Exam   Constitutional: She is oriented to person, place, and time. She appears well-developed and well-nourished. No distress.   HENT:   Head: Normocephalic and atraumatic.   Mouth/Throat: Oropharynx is clear and moist. No oropharyngeal exudate.   Eyes: Pupils are equal, round, and reactive to light.   Cardiovascular: Normal rate, regular rhythm, normal heart sounds and intact distal pulses. Exam reveals no gallop and no friction rub.   No murmur heard.  Pulmonary/Chest: Effort normal and breath sounds normal. No respiratory distress. She has no wheezes. She has no rales. She exhibits no tenderness.   Neurological: She is alert and oriented to person, place, and time.   Skin: She is not diaphoretic.   Psychiatric: She has a normal mood and affect. Her behavior is normal. Judgment and thought content normal.   Nursing note and vitals reviewed.              Assessment/Plan:     1. Bladder instability    - oxybutynin SR (DITROPAN-XL) 5 MG TABLET SR 24 HR; Take 1 Tab by mouth every day.  Dispense: 30 Tab; Refill: 3  - POCT Urinalysis    2. Glucose intolerance      3. Acquired hypothyroidism      4. Obesity (BMI 35.0-39.9 without comorbidity) (HCC)      "

## 2020-03-27 DIAGNOSIS — E66.9 OBESITY (BMI 35.0-39.9 WITHOUT COMORBIDITY): ICD-10-CM

## 2020-03-27 DIAGNOSIS — E03.9 HYPOTHYROIDISM, UNSPECIFIED TYPE: ICD-10-CM

## 2020-03-27 RX ORDER — LEVOTHYROXINE SODIUM 0.05 MG/1
TABLET ORAL
Qty: 90 TAB | Refills: 1 | Status: SHIPPED | OUTPATIENT
Start: 2020-03-27 | End: 2022-04-19

## 2020-06-18 DIAGNOSIS — E78.2 MIXED HYPERLIPIDEMIA: ICD-10-CM

## 2020-06-18 DIAGNOSIS — E74.39 GLUCOSE INTOLERANCE: ICD-10-CM

## 2020-06-18 DIAGNOSIS — E66.9 OBESITY (BMI 35.0-39.9 WITHOUT COMORBIDITY): ICD-10-CM

## 2020-06-18 DIAGNOSIS — E03.9 ACQUIRED HYPOTHYROIDISM: ICD-10-CM

## 2020-06-18 DIAGNOSIS — E03.4 HYPOTHYROIDISM DUE TO ACQUIRED ATROPHY OF THYROID: ICD-10-CM

## 2020-06-18 RX ORDER — LEVOTHYROXINE SODIUM 0.05 MG/1
TABLET ORAL
Qty: 90 TAB | Refills: 1 | Status: SHIPPED | OUTPATIENT
Start: 2020-06-18 | End: 2021-04-06

## 2020-06-18 NOTE — TELEPHONE ENCOUNTER
Julieta manley to pt to see what she was needing she said she just needs the refill. I submitted it to Dr. Clark.

## 2020-06-18 NOTE — TELEPHONE ENCOUNTER
Patient called requesting a refill of her levothyroxine prescription, her last office visit with Dr. Clark was 10/14/2019.     She was also requesting some advice regarding another medication.     The patient can be reached at: [phone]

## 2021-04-06 DIAGNOSIS — E74.39 GLUCOSE INTOLERANCE: ICD-10-CM

## 2021-04-06 DIAGNOSIS — E03.4 HYPOTHYROIDISM DUE TO ACQUIRED ATROPHY OF THYROID: ICD-10-CM

## 2021-04-06 DIAGNOSIS — E78.2 MIXED HYPERLIPIDEMIA: ICD-10-CM

## 2021-04-06 DIAGNOSIS — E66.9 OBESITY (BMI 35.0-39.9 WITHOUT COMORBIDITY): ICD-10-CM

## 2021-04-06 DIAGNOSIS — E03.9 ACQUIRED HYPOTHYROIDISM: ICD-10-CM

## 2021-04-06 RX ORDER — LEVOTHYROXINE SODIUM 0.05 MG/1
TABLET ORAL
Qty: 90 TABLET | Refills: 0 | Status: SHIPPED | OUTPATIENT
Start: 2021-04-06 | End: 2021-08-06

## 2021-08-06 DIAGNOSIS — E03.9 ACQUIRED HYPOTHYROIDISM: ICD-10-CM

## 2021-08-06 DIAGNOSIS — E03.4 HYPOTHYROIDISM DUE TO ACQUIRED ATROPHY OF THYROID: ICD-10-CM

## 2021-08-06 DIAGNOSIS — E74.39 GLUCOSE INTOLERANCE: ICD-10-CM

## 2021-08-06 DIAGNOSIS — E66.9 OBESITY (BMI 35.0-39.9 WITHOUT COMORBIDITY): ICD-10-CM

## 2021-08-06 DIAGNOSIS — E78.2 MIXED HYPERLIPIDEMIA: ICD-10-CM

## 2021-08-08 RX ORDER — LEVOTHYROXINE SODIUM 0.05 MG/1
TABLET ORAL
Qty: 90 TABLET | Refills: 0 | Status: SHIPPED | OUTPATIENT
Start: 2021-08-08 | End: 2022-04-19 | Stop reason: SDUPTHER

## 2022-04-19 ENCOUNTER — OFFICE VISIT (OUTPATIENT)
Dept: MEDICAL GROUP | Facility: PHYSICIAN GROUP | Age: 72
End: 2022-04-19
Payer: COMMERCIAL

## 2022-04-19 VITALS
BODY MASS INDEX: 36.83 KG/M2 | SYSTOLIC BLOOD PRESSURE: 128 MMHG | WEIGHT: 187.6 LBS | TEMPERATURE: 97.8 F | HEART RATE: 67 BPM | RESPIRATION RATE: 14 BRPM | OXYGEN SATURATION: 96 % | HEIGHT: 60 IN | DIASTOLIC BLOOD PRESSURE: 76 MMHG

## 2022-04-19 DIAGNOSIS — Z78.0 POST-MENOPAUSAL: ICD-10-CM

## 2022-04-19 DIAGNOSIS — E03.9 ACQUIRED HYPOTHYROIDISM: ICD-10-CM

## 2022-04-19 DIAGNOSIS — E74.39 GLUCOSE INTOLERANCE: ICD-10-CM

## 2022-04-19 DIAGNOSIS — E66.9 OBESITY (BMI 35.0-39.9 WITHOUT COMORBIDITY): ICD-10-CM

## 2022-04-19 DIAGNOSIS — Z12.31 ENCOUNTER FOR SCREENING MAMMOGRAM FOR BREAST CANCER: ICD-10-CM

## 2022-04-19 PROCEDURE — 99214 OFFICE O/P EST MOD 30 MIN: CPT | Performed by: FAMILY MEDICINE

## 2022-04-19 RX ORDER — LEVOTHYROXINE SODIUM 0.05 MG/1
TABLET ORAL
Qty: 90 TABLET | Refills: 3 | Status: SHIPPED | OUTPATIENT
Start: 2022-04-19 | End: 2022-07-25 | Stop reason: SDUPTHER

## 2022-04-19 ASSESSMENT — ENCOUNTER SYMPTOMS
EYES NEGATIVE: 1
PALPITATIONS: 0
HEARTBURN: 0
TINGLING: 0
NEUROLOGICAL NEGATIVE: 1
CONSTITUTIONAL NEGATIVE: 1
DOUBLE VISION: 0
BRUISES/BLEEDS EASILY: 0
HEADACHES: 0
GASTROINTESTINAL NEGATIVE: 1
HEMOPTYSIS: 0
CHILLS: 0
RESPIRATORY NEGATIVE: 1
BLURRED VISION: 0
COUGH: 0
MUSCULOSKELETAL NEGATIVE: 1
FEVER: 0
PSYCHIATRIC NEGATIVE: 1
DIZZINESS: 0
CARDIOVASCULAR NEGATIVE: 1
MYALGIAS: 0
NAUSEA: 0
DEPRESSION: 0

## 2022-04-19 NOTE — PROGRESS NOTES
Subjective     Marilou Funes is a 72 y.o. female who presents with Medication Refill (levothyroxine)            1. Acquired hypothyroidism  Patient is being treated for hypothyroidism, currently taking meds, no symptoms of fast or slow heartbeat and energy level steady. No new hair loss or skin symptoms. Labs have been checked in past year and are stable on current dose.  controlled     levothyroxine (EUTHYROX) 50 MCG Tab; TAKE 1 TABLET BY MOUTH ONCE DAILY IN THE MORNING ON AN EMPTY STOMACH  Dispense: 90 Tablet; Refill: 3   TRIIDOTHYRONINE; Future   TSH; Future   FREE THYROXINE; Future    2. Glucose intolerance  Needs new labs  Due to the patient's issues with glucose management, today they were extensively counseled on a low carb diet and exercise and losing weight     Comp Metabolic Panel; Future   Hemoglobin A1c; Future   Lipid Profile; Future   Microalbumin Creat Ratio Urine - Lab Collect; Future    3. Obesity (BMI 35.0-39.9 without comorbidity) (HCC)     Patient identified as having weight management issue.  Appropriate orders and counseling given.    4. Encounter for screening mammogram for breast cancer     MA-SCREENING MAMMO BILAT W/CAD; Future    5. Post-menopausal     DS-BONE DENSITY STUDY (DEXA)    Past Medical History:  No date: Hypothyroid  No past surgical history on file.  Social History    Tobacco Use      Smoking status: Never Smoker      Smokeless tobacco: Never Used    Vaping Use      Vaping Use: Never used    Alcohol use: No    Drug use: No    No family history on file.      Current Outpatient Medications: •  levothyroxine (EUTHYROX) 50 MCG Tab, TAKE 1 TABLET BY MOUTH ONCE DAILY IN THE MORNING ON AN EMPTY STOMACH, Disp: 90 Tablet, Rfl: 3    Patient was instructed on the use of medications, either prescriptions or OTC and informed on when the appropriate follow up time period should be. In addition, patient was also instructed that should any acute worsening occur that they  should notify this clinic asap or call 911.          Review of Systems   Constitutional: Negative.  Negative for chills and fever.   HENT: Negative.  Negative for hearing loss.    Eyes: Negative.  Negative for blurred vision and double vision.   Respiratory: Negative.  Negative for cough and hemoptysis.    Cardiovascular: Negative.  Negative for chest pain and palpitations.   Gastrointestinal: Negative.  Negative for heartburn and nausea.   Genitourinary: Negative.  Negative for dysuria.   Musculoskeletal: Negative.  Negative for myalgias.   Skin: Negative.  Negative for rash.   Neurological: Negative.  Negative for dizziness, tingling and headaches.   Endo/Heme/Allergies: Negative.  Does not bruise/bleed easily.   Psychiatric/Behavioral: Negative.  Negative for depression and suicidal ideas.   All other systems reviewed and are negative.             Objective     /76 (BP Location: Left arm, Patient Position: Sitting, BP Cuff Size: Adult)   Pulse 67   Temp 36.6 °C (97.8 °F) (Temporal)   Resp 14   Ht 1.524 m (5')   Wt 85.1 kg (187 lb 9.6 oz)   SpO2 96%   Breastfeeding No   BMI 36.64 kg/m²      Physical Exam  Vitals and nursing note reviewed.   Constitutional:       General: She is not in acute distress.     Appearance: She is well-developed. She is not diaphoretic.   HENT:      Head: Normocephalic and atraumatic.      Mouth/Throat:      Pharynx: No oropharyngeal exudate.   Eyes:      Pupils: Pupils are equal, round, and reactive to light.   Cardiovascular:      Rate and Rhythm: Normal rate and regular rhythm.      Heart sounds: Normal heart sounds. No murmur heard.    No friction rub. No gallop.   Pulmonary:      Effort: Pulmonary effort is normal. No respiratory distress.      Breath sounds: Normal breath sounds. No wheezing or rales.   Chest:      Chest wall: No tenderness.   Neurological:      Mental Status: She is alert and oriented to person, place, and time.   Psychiatric:         Behavior:  Behavior normal.         Thought Content: Thought content normal.         Judgment: Judgment normal.                             Assessment & Plan        1. Acquired hypothyroidism    - levothyroxine (EUTHYROX) 50 MCG Tab; TAKE 1 TABLET BY MOUTH ONCE DAILY IN THE MORNING ON AN EMPTY STOMACH  Dispense: 90 Tablet; Refill: 3  - TRIIDOTHYRONINE; Future  - TSH; Future  - FREE THYROXINE; Future    2. Glucose intolerance    - Comp Metabolic Panel; Future  - Hemoglobin A1c; Future  - Lipid Profile; Future  - Microalbumin Creat Ratio Urine - Lab Collect; Future    3. Obesity (BMI 35.0-39.9 without comorbidity) (HCC)    - Patient identified as having weight management issue.  Appropriate orders and counseling given.    4. Encounter for screening mammogram for breast cancer    - MA-SCREENING MAMMO BILAT W/CAD; Future    5. Post-menopausal    - DS-BONE DENSITY STUDY (DEXA)

## 2022-07-22 ENCOUNTER — HOSPITAL ENCOUNTER (OUTPATIENT)
Dept: LAB | Facility: MEDICAL CENTER | Age: 72
End: 2022-07-22
Attending: FAMILY MEDICINE
Payer: COMMERCIAL

## 2022-07-22 DIAGNOSIS — E74.39 GLUCOSE INTOLERANCE: ICD-10-CM

## 2022-07-22 DIAGNOSIS — E03.9 ACQUIRED HYPOTHYROIDISM: ICD-10-CM

## 2022-07-22 LAB
ALBUMIN SERPL BCP-MCNC: 4.2 G/DL (ref 3.2–4.9)
ALBUMIN/GLOB SERPL: 1.3 G/DL
ALP SERPL-CCNC: 89 U/L (ref 30–99)
ALT SERPL-CCNC: 87 U/L (ref 2–50)
ANION GAP SERPL CALC-SCNC: 11 MMOL/L (ref 7–16)
AST SERPL-CCNC: 56 U/L (ref 12–45)
BILIRUB SERPL-MCNC: 0.6 MG/DL (ref 0.1–1.5)
BUN SERPL-MCNC: 10 MG/DL (ref 8–22)
CALCIUM SERPL-MCNC: 9.6 MG/DL (ref 8.5–10.5)
CHLORIDE SERPL-SCNC: 103 MMOL/L (ref 96–112)
CHOLEST SERPL-MCNC: 245 MG/DL (ref 100–199)
CO2 SERPL-SCNC: 26 MMOL/L (ref 20–33)
CREAT SERPL-MCNC: 0.6 MG/DL (ref 0.5–1.4)
CREAT UR-MCNC: 107.49 MG/DL
EST. AVERAGE GLUCOSE BLD GHB EST-MCNC: 128 MG/DL
GFR SERPLBLD CREATININE-BSD FMLA CKD-EPI: 96 ML/MIN/1.73 M 2
GLOBULIN SER CALC-MCNC: 3.2 G/DL (ref 1.9–3.5)
GLUCOSE SERPL-MCNC: 107 MG/DL (ref 65–99)
HBA1C MFR BLD: 6.1 % (ref 4–5.6)
HDLC SERPL-MCNC: 39 MG/DL
LDLC SERPL CALC-MCNC: 158 MG/DL
MICROALBUMIN UR-MCNC: 1.5 MG/DL
MICROALBUMIN/CREAT UR: 14 MG/G (ref 0–30)
POTASSIUM SERPL-SCNC: 4.3 MMOL/L (ref 3.6–5.5)
PROT SERPL-MCNC: 7.4 G/DL (ref 6–8.2)
SODIUM SERPL-SCNC: 140 MMOL/L (ref 135–145)
T3 SERPL-MCNC: 92.8 NG/DL (ref 60–181)
T4 FREE SERPL-MCNC: 0.94 NG/DL (ref 0.93–1.7)
TRIGL SERPL-MCNC: 239 MG/DL (ref 0–149)
TSH SERPL DL<=0.005 MIU/L-ACNC: 6.89 UIU/ML (ref 0.38–5.33)

## 2022-07-22 PROCEDURE — 82043 UR ALBUMIN QUANTITATIVE: CPT

## 2022-07-22 PROCEDURE — 84443 ASSAY THYROID STIM HORMONE: CPT

## 2022-07-22 PROCEDURE — 36415 COLL VENOUS BLD VENIPUNCTURE: CPT

## 2022-07-22 PROCEDURE — 83036 HEMOGLOBIN GLYCOSYLATED A1C: CPT

## 2022-07-22 PROCEDURE — 84439 ASSAY OF FREE THYROXINE: CPT

## 2022-07-22 PROCEDURE — 82570 ASSAY OF URINE CREATININE: CPT

## 2022-07-22 PROCEDURE — 80061 LIPID PANEL: CPT

## 2022-07-22 PROCEDURE — 84480 ASSAY TRIIODOTHYRONINE (T3): CPT

## 2022-07-22 PROCEDURE — 80053 COMPREHEN METABOLIC PANEL: CPT

## 2022-07-25 DIAGNOSIS — E03.9 ACQUIRED HYPOTHYROIDISM: ICD-10-CM

## 2022-07-25 RX ORDER — LEVOTHYROXINE SODIUM 0.05 MG/1
TABLET ORAL
Qty: 90 TABLET | Refills: 3 | Status: SHIPPED | OUTPATIENT
Start: 2022-07-25 | End: 2023-01-31 | Stop reason: SDUPTHER

## 2022-07-25 NOTE — TELEPHONE ENCOUNTER
Received request via: Patient    Was the patient seen in the last year in this department? Yes    Does the patient have an active prescription (recently filled or refills available) for medication(s) requested? No    Levothyroxine 50 MCG Tab

## 2022-07-28 ENCOUNTER — TELEPHONE (OUTPATIENT)
Dept: MEDICAL GROUP | Facility: PHYSICIAN GROUP | Age: 72
End: 2022-07-28
Payer: COMMERCIAL

## 2022-07-28 NOTE — TELEPHONE ENCOUNTER
----- Message from Chemo Clark M.D. sent at 7/28/2022  9:03 AM PDT -----  This patient needs an appointment within the next week. Please schedule this with the patient so we may discuss their lab results

## 2022-08-03 ENCOUNTER — OFFICE VISIT (OUTPATIENT)
Dept: MEDICAL GROUP | Facility: PHYSICIAN GROUP | Age: 72
End: 2022-08-03
Payer: COMMERCIAL

## 2022-08-03 VITALS
HEIGHT: 60 IN | BODY MASS INDEX: 36.71 KG/M2 | RESPIRATION RATE: 12 BRPM | TEMPERATURE: 97.9 F | WEIGHT: 187 LBS | DIASTOLIC BLOOD PRESSURE: 72 MMHG | SYSTOLIC BLOOD PRESSURE: 138 MMHG | OXYGEN SATURATION: 94 % | HEART RATE: 64 BPM

## 2022-08-03 DIAGNOSIS — E66.9 OBESITY (BMI 35.0-39.9 WITHOUT COMORBIDITY): ICD-10-CM

## 2022-08-03 DIAGNOSIS — E74.39 GLUCOSE INTOLERANCE: ICD-10-CM

## 2022-08-03 DIAGNOSIS — R79.89 ELEVATED LFTS: ICD-10-CM

## 2022-08-03 PROCEDURE — 99214 OFFICE O/P EST MOD 30 MIN: CPT | Performed by: FAMILY MEDICINE

## 2022-08-03 ASSESSMENT — ENCOUNTER SYMPTOMS
NEUROLOGICAL NEGATIVE: 1
CARDIOVASCULAR NEGATIVE: 1
PSYCHIATRIC NEGATIVE: 1
DEPRESSION: 0
MYALGIAS: 0
TINGLING: 0
EYES NEGATIVE: 1
COUGH: 0
HEMOPTYSIS: 0
HEADACHES: 0
BLURRED VISION: 0
RESPIRATORY NEGATIVE: 1
DIZZINESS: 0
CONSTITUTIONAL NEGATIVE: 1
GASTROINTESTINAL NEGATIVE: 1
FEVER: 0
CHILLS: 0
BRUISES/BLEEDS EASILY: 0
DOUBLE VISION: 0
NAUSEA: 0
MUSCULOSKELETAL NEGATIVE: 1
PALPITATIONS: 0
HEARTBURN: 0

## 2022-08-03 ASSESSMENT — PATIENT HEALTH QUESTIONNAIRE - PHQ9: CLINICAL INTERPRETATION OF PHQ2 SCORE: 0

## 2022-08-03 NOTE — PROGRESS NOTES
Subjective     Marilou Funes is a 72 y.o. female who presents with Follow-Up (Lab results/mammogram/bone density)            1. Elevated LFTs  Will send to gi for eval  , likely fatty liver   Referral to Gastroenterology    2. Obesity (BMI 35.0-39.9 without comorbidity) (HCC)  Patient has a diagnosis of obesity. They were counseled today on increasing exercise and  extensively counseled on a low carb diet       3. Glucose intolerance  Due to the patient's issues with glucose management, today they were extensively counseled on a low carb diet and exercise and losing weight  Patient is being treated for hypothyroidism, currently taking meds, no symptoms of fast or slow heartbeat and energy level steady. No new hair loss or skin symptoms. Labs have been checked in past year and are stable on current dose.  controlled      Past Medical History:  No date: Hypothyroid  No past surgical history on file.  Social History    Tobacco Use      Smoking status: Never Smoker      Smokeless tobacco: Never Used    Vaping Use      Vaping Use: Never used    Alcohol use: No    Drug use: No    No family history on file.      Current Outpatient Medications: •  levothyroxine (EUTHYROX) 50 MCG Tab, TAKE 1 TABLET BY MOUTH ONCE DAILY IN THE MORNING ON AN EMPTY STOMACH, Disp: 90 Tablet, Rfl: 3    Patient was instructed on the use of medications, either prescriptions or OTC and informed on when the appropriate follow up time period should be. In addition, patient was also instructed that should any acute worsening occur that they should notify this clinic asap or call 911.          Review of Systems   Constitutional: Negative.  Negative for chills and fever.   HENT: Negative.  Negative for hearing loss.    Eyes: Negative.  Negative for blurred vision and double vision.   Respiratory: Negative.  Negative for cough and hemoptysis.    Cardiovascular: Negative.  Negative for chest pain and palpitations.   Gastrointestinal:  Negative.  Negative for heartburn and nausea.   Genitourinary: Negative.  Negative for dysuria.   Musculoskeletal: Negative.  Negative for myalgias.   Skin: Negative.  Negative for rash.   Neurological: Negative.  Negative for dizziness, tingling and headaches.   Endo/Heme/Allergies: Negative.  Does not bruise/bleed easily.   Psychiatric/Behavioral: Negative.  Negative for depression and suicidal ideas.   All other systems reviewed and are negative.             Objective     /72 (BP Location: Right arm, Patient Position: Sitting, BP Cuff Size: Adult)   Pulse 64   Temp 36.6 °C (97.9 °F) (Temporal)   Resp 12   Ht 1.524 m (5')   Wt 84.8 kg (187 lb)   SpO2 94%   Breastfeeding No   BMI 36.52 kg/m²      Physical Exam  Vitals and nursing note reviewed.   Constitutional:       General: She is not in acute distress.     Appearance: She is well-developed. She is not diaphoretic.   HENT:      Head: Normocephalic and atraumatic.      Mouth/Throat:      Pharynx: No oropharyngeal exudate.   Eyes:      Pupils: Pupils are equal, round, and reactive to light.   Cardiovascular:      Rate and Rhythm: Normal rate and regular rhythm.      Heart sounds: Normal heart sounds. No murmur heard.    No friction rub. No gallop.   Pulmonary:      Effort: Pulmonary effort is normal. No respiratory distress.      Breath sounds: Normal breath sounds. No wheezing or rales.   Chest:      Chest wall: No tenderness.   Neurological:      Mental Status: She is alert and oriented to person, place, and time.   Psychiatric:         Behavior: Behavior normal.         Thought Content: Thought content normal.         Judgment: Judgment normal.                             Assessment & Plan        1. Elevated LFTs    - Referral to Gastroenterology    2. Obesity (BMI 35.0-39.9 without comorbidity) ([facility])      3. Glucose intolerance

## 2022-11-18 ENCOUNTER — HOME HEALTH ADMISSION (OUTPATIENT)
Dept: HOME HEALTH SERVICES | Facility: HOME HEALTHCARE | Age: 72
End: 2022-11-18
Payer: COMMERCIAL

## 2023-01-31 ENCOUNTER — OFFICE VISIT (OUTPATIENT)
Dept: MEDICAL GROUP | Facility: PHYSICIAN GROUP | Age: 73
End: 2023-01-31
Payer: COMMERCIAL

## 2023-01-31 VITALS
TEMPERATURE: 98.1 F | HEIGHT: 60 IN | HEART RATE: 67 BPM | WEIGHT: 188 LBS | SYSTOLIC BLOOD PRESSURE: 130 MMHG | OXYGEN SATURATION: 97 % | RESPIRATION RATE: 16 BRPM | BODY MASS INDEX: 36.91 KG/M2 | DIASTOLIC BLOOD PRESSURE: 82 MMHG

## 2023-01-31 DIAGNOSIS — E03.9 ACQUIRED HYPOTHYROIDISM: ICD-10-CM

## 2023-01-31 DIAGNOSIS — E74.39 GLUCOSE INTOLERANCE: ICD-10-CM

## 2023-01-31 DIAGNOSIS — M19.019 SHOULDER ARTHRITIS: ICD-10-CM

## 2023-01-31 DIAGNOSIS — E78.2 MIXED HYPERLIPIDEMIA: ICD-10-CM

## 2023-01-31 PROCEDURE — 99214 OFFICE O/P EST MOD 30 MIN: CPT | Performed by: FAMILY MEDICINE

## 2023-01-31 RX ORDER — MELOXICAM 7.5 MG/1
7.5 TABLET ORAL DAILY
Qty: 30 TABLET | Refills: 3 | Status: SHIPPED | OUTPATIENT
Start: 2023-01-31

## 2023-01-31 RX ORDER — LEVOTHYROXINE SODIUM 0.05 MG/1
TABLET ORAL
Qty: 90 TABLET | Refills: 3 | Status: SHIPPED | OUTPATIENT
Start: 2023-01-31

## 2023-01-31 ASSESSMENT — ENCOUNTER SYMPTOMS
PALPITATIONS: 0
CARDIOVASCULAR NEGATIVE: 1
NEUROLOGICAL NEGATIVE: 1
DOUBLE VISION: 0
DEPRESSION: 0
FEVER: 0
BRUISES/BLEEDS EASILY: 0
TINGLING: 0
GASTROINTESTINAL NEGATIVE: 1
HEADACHES: 0
EYES NEGATIVE: 1
CONSTITUTIONAL NEGATIVE: 1
CHILLS: 0
BLURRED VISION: 0
HEARTBURN: 0
NAUSEA: 0
PSYCHIATRIC NEGATIVE: 1
RESPIRATORY NEGATIVE: 1
COUGH: 0
HEMOPTYSIS: 0
MYALGIAS: 0
DIZZINESS: 0

## 2023-01-31 ASSESSMENT — PATIENT HEALTH QUESTIONNAIRE - PHQ9: CLINICAL INTERPRETATION OF PHQ2 SCORE: 0

## 2023-02-01 NOTE — PROGRESS NOTES
Subjective     Marilou Funes is a 72 y.o. female who presents with Follow-Up (3 month)            1. Acquired hypothyroidism  Patient is being treated for hypothyroidism, currently taking meds, no symptoms of fast or slow heartbeat and energy level steady. No new hair loss or skin symptoms. Labs have been checked in past year and are stable on current dose.  controlled       TSH; Future   TRIIDOTHYRONINE; Future   HEMOGLOBIN A1C; Future   Lipid Profile; Future   FREE THYROXINE; Future   Comp Metabolic Panel; Future   levothyroxine (EUTHYROX) 50 MCG Tab; TAKE 1 TABLET BY MOUTH ONCE DAILY IN THE MORNING ON AN EMPTY STOMACH  Dispense: 90 Tablet; Refill: 3    2. Glucose intolerance  Due to the patient's issues with glucose management, today they were extensively counseled on a low carb diet and exercise and losing weight     TSH; Future   TRIIDOTHYRONINE; Future   HEMOGLOBIN A1C; Future   Lipid Profile; Future   FREE THYROXINE; Future   Comp Metabolic Panel; Future    3. Mixed hyperlipidemia  Currently treated for HLD, taking meds with no new myalgias or joint pain, watching fats in diet  controlled       TSH; Future   TRIIDOTHYRONINE; Future   HEMOGLOBIN A1C; Future   Lipid Profile; Future   FREE THYROXINE; Future   Comp Metabolic Panel; Future    4. Shoulder arthritis  Pain in both shoulder joint areas at night time and with use   TSH; Future   TRIIDOTHYRONINE; Future   HEMOGLOBIN A1C; Future   Lipid Profile; Future   FREE THYROXINE; Future   Comp Metabolic Panel; Future   meloxicam (MOBIC) 7.5 MG Tab; Take 1 Tablet by mouth every day.  Dispense: 30 Tablet; Refill: 3    Past Medical History:  No date: Hypothyroid  No past surgical history on file.  Social History    Tobacco Use      Smoking status: Never      Smokeless tobacco: Never    Vaping Use      Vaping Use: Never used    Alcohol use: No    Drug use: No    No family history on file.      Current Outpatient Medications: ·  meloxicam (MOBIC)  7.5 MG Tab, Take 1 Tablet by mouth every day., Disp: 30 Tablet, Rfl: 3·  levothyroxine (EUTHYROX) 50 MCG Tab, TAKE 1 TABLET BY MOUTH ONCE DAILY IN THE MORNING ON AN EMPTY STOMACH, Disp: 90 Tablet, Rfl: 3    Patient was instructed on the use of medications, either prescriptions or OTC and informed on when the appropriate follow up time period should be. In addition, patient was also instructed that should any acute worsening occur that they should notify this clinic asap or call 911.            Review of Systems   Constitutional: Negative.  Negative for chills and fever.   HENT: Negative.  Negative for hearing loss.    Eyes: Negative.  Negative for blurred vision and double vision.   Respiratory: Negative.  Negative for cough and hemoptysis.    Cardiovascular: Negative.  Negative for chest pain and palpitations.   Gastrointestinal: Negative.  Negative for heartburn and nausea.   Genitourinary: Negative.  Negative for dysuria.   Musculoskeletal:  Positive for joint pain. Negative for myalgias.   Skin: Negative.  Negative for rash.   Neurological: Negative.  Negative for dizziness, tingling and headaches.   Endo/Heme/Allergies: Negative.  Does not bruise/bleed easily.   Psychiatric/Behavioral: Negative.  Negative for depression and suicidal ideas.    All other systems reviewed and are negative.           Objective     /82 (BP Location: Left arm, Patient Position: Sitting, BP Cuff Size: Adult long)   Pulse 67   Temp 36.7 °C (98.1 °F) (Temporal)   Resp 16   Ht 1.524 m (5')   Wt 85.3 kg (188 lb)   SpO2 97%   BMI 36.72 kg/m²      Physical Exam  Vitals and nursing note reviewed.   Constitutional:       General: She is not in acute distress.     Appearance: She is well-developed. She is not diaphoretic.   HENT:      Head: Normocephalic and atraumatic.      Mouth/Throat:      Pharynx: No oropharyngeal exudate.   Eyes:      Pupils: Pupils are equal, round, and reactive to light.   Cardiovascular:      Rate and  Rhythm: Normal rate and regular rhythm.      Heart sounds: Normal heart sounds. No murmur heard.    No friction rub. No gallop.   Pulmonary:      Effort: Pulmonary effort is normal. No respiratory distress.      Breath sounds: Normal breath sounds. No wheezing or rales.   Chest:      Chest wall: No tenderness.   Neurological:      Mental Status: She is alert and oriented to person, place, and time.   Psychiatric:         Behavior: Behavior normal.         Thought Content: Thought content normal.         Judgment: Judgment normal.                           Assessment & Plan        1. Acquired hypothyroidism    - TSH; Future  - TRIIDOTHYRONINE; Future  - HEMOGLOBIN A1C; Future  - Lipid Profile; Future  - FREE THYROXINE; Future  - Comp Metabolic Panel; Future  - levothyroxine (EUTHYROX) 50 MCG Tab; TAKE 1 TABLET BY MOUTH ONCE DAILY IN THE MORNING ON AN EMPTY STOMACH  Dispense: 90 Tablet; Refill: 3    2. Glucose intolerance    - TSH; Future  - TRIIDOTHYRONINE; Future  - HEMOGLOBIN A1C; Future  - Lipid Profile; Future  - FREE THYROXINE; Future  - Comp Metabolic Panel; Future    3. Mixed hyperlipidemia    - TSH; Future  - TRIIDOTHYRONINE; Future  - HEMOGLOBIN A1C; Future  - Lipid Profile; Future  - FREE THYROXINE; Future  - Comp Metabolic Panel; Future    4. Shoulder arthritis  - TSH; Future  - TRIIDOTHYRONINE; Future  - HEMOGLOBIN A1C; Future  - Lipid Profile; Future  - FREE THYROXINE; Future  - Comp Metabolic Panel; Future  - meloxicam (MOBIC) 7.5 MG Tab; Take 1 Tablet by mouth every day.  Dispense: 30 Tablet; Refill: 3

## 2024-03-15 ENCOUNTER — HOSPITAL ENCOUNTER (EMERGENCY)
Facility: MEDICAL CENTER | Age: 74
End: 2024-03-15
Attending: EMERGENCY MEDICINE
Payer: COMMERCIAL

## 2024-03-15 ENCOUNTER — APPOINTMENT (OUTPATIENT)
Dept: RADIOLOGY | Facility: MEDICAL CENTER | Age: 74
End: 2024-03-15
Attending: EMERGENCY MEDICINE
Payer: COMMERCIAL

## 2024-03-15 VITALS
RESPIRATION RATE: 18 BRPM | OXYGEN SATURATION: 95 % | BODY MASS INDEX: 36.12 KG/M2 | WEIGHT: 184.97 LBS | SYSTOLIC BLOOD PRESSURE: 213 MMHG | HEART RATE: 73 BPM | TEMPERATURE: 97.7 F | DIASTOLIC BLOOD PRESSURE: 93 MMHG

## 2024-03-15 DIAGNOSIS — K59.00 CONSTIPATION, UNSPECIFIED CONSTIPATION TYPE: ICD-10-CM

## 2024-03-15 DIAGNOSIS — N39.0 ACUTE UTI: Primary | ICD-10-CM

## 2024-03-15 DIAGNOSIS — R10.84 GENERALIZED ABDOMINAL PAIN: ICD-10-CM

## 2024-03-15 LAB
ALBUMIN SERPL BCP-MCNC: 4 G/DL (ref 3.2–4.9)
ALBUMIN/GLOB SERPL: 1.1 G/DL
ALP SERPL-CCNC: 88 U/L (ref 30–99)
ALT SERPL-CCNC: 49 U/L (ref 2–50)
ANION GAP SERPL CALC-SCNC: 12 MMOL/L (ref 7–16)
APPEARANCE UR: CLEAR
AST SERPL-CCNC: 39 U/L (ref 12–45)
BASOPHILS # BLD AUTO: 0.8 % (ref 0–1.8)
BASOPHILS # BLD: 0.05 K/UL (ref 0–0.12)
BILIRUB SERPL-MCNC: 0.3 MG/DL (ref 0.1–1.5)
BILIRUB UR QL STRIP.AUTO: NEGATIVE
BUN SERPL-MCNC: 12 MG/DL (ref 8–22)
CALCIUM ALBUM COR SERPL-MCNC: 9.6 MG/DL (ref 8.5–10.5)
CALCIUM SERPL-MCNC: 9.6 MG/DL (ref 8.5–10.5)
CHLORIDE SERPL-SCNC: 103 MMOL/L (ref 96–112)
CO2 SERPL-SCNC: 24 MMOL/L (ref 20–33)
COLOR UR: YELLOW
CREAT SERPL-MCNC: 0.58 MG/DL (ref 0.5–1.4)
EOSINOPHIL # BLD AUTO: 0.13 K/UL (ref 0–0.51)
EOSINOPHIL NFR BLD: 2 % (ref 0–6.9)
ERYTHROCYTE [DISTWIDTH] IN BLOOD BY AUTOMATED COUNT: 43.8 FL (ref 35.9–50)
GFR SERPLBLD CREATININE-BSD FMLA CKD-EPI: 96 ML/MIN/1.73 M 2
GLOBULIN SER CALC-MCNC: 3.5 G/DL (ref 1.9–3.5)
GLUCOSE SERPL-MCNC: 117 MG/DL (ref 65–99)
GLUCOSE UR STRIP.AUTO-MCNC: NEGATIVE MG/DL
HCT VFR BLD AUTO: 45 % (ref 37–47)
HGB BLD-MCNC: 15.1 G/DL (ref 12–16)
IMM GRANULOCYTES # BLD AUTO: 0.02 K/UL (ref 0–0.11)
IMM GRANULOCYTES NFR BLD AUTO: 0.3 % (ref 0–0.9)
KETONES UR STRIP.AUTO-MCNC: NEGATIVE MG/DL
LEUKOCYTE ESTERASE UR QL STRIP.AUTO: NEGATIVE
LIPASE SERPL-CCNC: 41 U/L (ref 11–82)
LYMPHOCYTES # BLD AUTO: 2 K/UL (ref 1–4.8)
LYMPHOCYTES NFR BLD: 31.3 % (ref 22–41)
MCH RBC QN AUTO: 30.3 PG (ref 27–33)
MCHC RBC AUTO-ENTMCNC: 33.6 G/DL (ref 32.2–35.5)
MCV RBC AUTO: 90.4 FL (ref 81.4–97.8)
MICRO URNS: NORMAL
MONOCYTES # BLD AUTO: 0.68 K/UL (ref 0–0.85)
MONOCYTES NFR BLD AUTO: 10.7 % (ref 0–13.4)
NEUTROPHILS # BLD AUTO: 3.5 K/UL (ref 1.82–7.42)
NEUTROPHILS NFR BLD: 54.9 % (ref 44–72)
NITRITE UR QL STRIP.AUTO: NEGATIVE
NRBC # BLD AUTO: 0 K/UL
NRBC BLD-RTO: 0 /100 WBC (ref 0–0.2)
PH UR STRIP.AUTO: 7 [PH] (ref 5–8)
PLATELET # BLD AUTO: 229 K/UL (ref 164–446)
PMV BLD AUTO: 10.7 FL (ref 9–12.9)
POTASSIUM SERPL-SCNC: 4.3 MMOL/L (ref 3.6–5.5)
PROT SERPL-MCNC: 7.5 G/DL (ref 6–8.2)
PROT UR QL STRIP: NEGATIVE MG/DL
RBC # BLD AUTO: 4.98 M/UL (ref 4.2–5.4)
RBC UR QL AUTO: NEGATIVE
SODIUM SERPL-SCNC: 139 MMOL/L (ref 135–145)
SP GR UR STRIP.AUTO: 1
UROBILINOGEN UR STRIP.AUTO-MCNC: 0.2 MG/DL
WBC # BLD AUTO: 6.4 K/UL (ref 4.8–10.8)

## 2024-03-15 PROCEDURE — 83690 ASSAY OF LIPASE: CPT

## 2024-03-15 PROCEDURE — 74177 CT ABD & PELVIS W/CONTRAST: CPT

## 2024-03-15 PROCEDURE — 80053 COMPREHEN METABOLIC PANEL: CPT

## 2024-03-15 PROCEDURE — 85025 COMPLETE CBC W/AUTO DIFF WBC: CPT

## 2024-03-15 PROCEDURE — 700117 HCHG RX CONTRAST REV CODE 255: Performed by: EMERGENCY MEDICINE

## 2024-03-15 PROCEDURE — 99284 EMERGENCY DEPT VISIT MOD MDM: CPT

## 2024-03-15 PROCEDURE — 81003 URINALYSIS AUTO W/O SCOPE: CPT

## 2024-03-15 PROCEDURE — 96374 THER/PROPH/DIAG INJ IV PUSH: CPT

## 2024-03-15 PROCEDURE — 36415 COLL VENOUS BLD VENIPUNCTURE: CPT

## 2024-03-15 PROCEDURE — 700111 HCHG RX REV CODE 636 W/ 250 OVERRIDE (IP): Mod: JZ | Performed by: EMERGENCY MEDICINE

## 2024-03-15 RX ORDER — CEFTRIAXONE 2 G/1
2000 INJECTION, POWDER, FOR SOLUTION INTRAMUSCULAR; INTRAVENOUS ONCE
Status: COMPLETED | OUTPATIENT
Start: 2024-03-15 | End: 2024-03-15

## 2024-03-15 RX ADMIN — CEFTRIAXONE SODIUM 2000 MG: 2 INJECTION, POWDER, FOR SOLUTION INTRAMUSCULAR; INTRAVENOUS at 16:50

## 2024-03-15 RX ADMIN — IOHEXOL 100 ML: 350 INJECTION, SOLUTION INTRAVENOUS at 16:10

## 2024-03-15 ASSESSMENT — FIBROSIS 4 INDEX: FIB4 SCORE: 1.96

## 2024-03-15 NOTE — DISCHARGE INSTRUCTIONS
Return here immediately if there are new or worsening symptoms.  Continue the antibiotics that have been started.  Call your primary care doctor and arrange office recheck as soon as possible during the week.  Use MiraLAX every night at home until bowel movements resume

## 2024-03-15 NOTE — ED TRIAGE NOTES
.Marilou Funes  .  Chief Complaint   Patient presents with    Abdominal Pain     Patient c/o LLQ pain radiating to back x 2 weeks.     Constipation     Last normal BM 1 week ago.     Patient ambulatory to triage with above complaints. Patient seen at [facility] for same x 2 and dx with pyelonephritis.     Patient to lobby and instructed to inform staff of any needs.

## 2024-03-15 NOTE — ED NOTES
Ambulated to bathroom urine collected and sent.   Pt hypertensive has not taken losartan today. Per erp pt nay take home medication.

## 2024-03-15 NOTE — ED PROVIDER NOTES
ED Provider Note    CHIEF COMPLAINT  Chief Complaint   Patient presents with    Abdominal Pain     Patient c/o LLQ pain radiating to back x 2 weeks.     Constipation     Last normal BM 1 week ago.       EXTERNAL RECORDS REVIEWED  Other I reviewed records from [facility] emergency department on March 7, 2024 and March 12, 2024.  On March 7 the patient was seen for abdominal pain and was evaluated that included CT of the abdomen and pelvis which showed ventral hernias containing fat and the left kidney angiomyolipoma. Urinalysis at that time was negative for nitrite and leukocyte Estrace and there were 3-5 white blood cells noted in the microscopic exam.  The white blood cell count was 8.8 and the complete metabolic panel and lipase are unremarkable.  The notes from March 12 indicate the patient was seen for left flank pain and the urinalysis at that time showed 2+ leukocyte Estrace, nitrate negative and 6-10 white blood cells and the patient was diagnosed with pyelonephritis and started on Omnicef.    HPI/ROS  LIMITATION TO HISTORY   Select: The patient herself speaks [place], she is here with her daughter who speaks very good English and is interpreting for her    Marilou Funes is a 74 y.o. female who presents to the emergency department brought in by family complaining of left-sided abdominal discomfort.  The family says that they have had 2 visits at [facility] and they do not understand why the patient is having pain.  Chart review and further questioning reveals that the patient was thought to have pyelonephritis and was started on Omnicef and the family tells me that she has now taken the antibiotic for 3 days and in fact she is starting to feel better but is still having some left side abdominal discomfort and has not had a bowel movement for 3 days.  The patient is not complaining that she feels constipated.  She usually has a bowel movement daily.  Review of systems: No  fever no nausea or vomiting the patient is able to tolerate oral intake.  The patient was noted to be somewhat hypertensive at the time of arrival and says that she did not take her antihypertensive medicines this morning.    PAST MEDICAL HISTORY   has a past medical history of Hypothyroid.    SURGICAL HISTORY  patient denies any surgical history    FAMILY HISTORY  History reviewed. No pertinent family history.    SOCIAL HISTORY  Social History     Tobacco Use    Smoking status: Never    Smokeless tobacco: Never   Vaping Use    Vaping Use: Never used   Substance and Sexual Activity    Alcohol use: No    Drug use: No    Sexual activity: Not Currently     Partners: Male       CURRENT MEDICATIONS  Home Medications       Reviewed by Kalina Aburto R.N. (Registered Nurse) on 03/15/24 at 1213  Med List Status: Complete     Medication Last Dose Status   levothyroxine (EUTHYROX) 50 MCG Tab  Active   meloxicam (MOBIC) 7.5 MG Tab  Active                    ALLERGIES  No Known Allergies    PHYSICAL EXAM  VITAL SIGNS: BP (!) 214/95   Pulse 81   Temp 36.6 °C (97.8 °F) (Temporal)   Resp 16   Wt 83.9 kg (184 lb 15.5 oz)   SpO2 94%   BMI 36.12 kg/m²    Constitutional: Awake lucid verbal pleasant individual she does not appear toxic or distressed  HENT: Mucous membranes are moist  Eyes: No erythema discharge or jaundice  Neck: Trachea midline no JVD  Cardiovascular: Regular rate and rhythm  Respiratory: Clear bilaterally with no apparent difficulty breathing  Abdomen: Soft and nontender no rebound guarding or peritoneal findings.  Bowel sounds are normally active  Back: No CVA tenderness with percussion  Skin: Warm and dry  Musculoskeletal: No acute bony deformity  Neurologic: Awake verbal moving all extremities without difficulty    DIAGNOSTIC STUDIES / PROCEDURES      LABS  CBC shows normal white blood cell count of 6.4 hemoglobin is adequate at 15.1 basic metabolic panel is unremarkable LFTs and lipase are unremarkable  urinalysis looks completely unremarkable with nitrite leukocyte Estrace and blood all negative.    RADIOLOGY  Radiologist interpretation:   CT-ABDOMEN-PELVIS WITH   Final Result      Findings moderately suspicious for LEFT pyelonephritis.            COURSE & MEDICAL DECISION MAKING  In the emergency department an IV is established.  I offered pain medication but the patient did not feel that she required any.  Because of the history and the finding of likely pyelonephritis on CT today she was given a dose of intravenous ceftriaxone.  I reviewed all the findings with the patient and her family and I think that her flank discomfort is in fact due to a urinary infection and likely pyelonephritis although the findings seem faint according to the radiology report and the urinalysis looks entirely normal so I think that the Omnicef is likely been treating this infection.  I think the patient may also have some discomfort because of constipation but she does not have any signs of bowel obstruction.  At this point I think the best course of action is to continue her outpatient antibiotics at home and MiraLAX at bedtime until she has a bowel movement.  I have reviewed this in detail with her and her family and I have also advised them that if at any point in time she is having new or worsening symptoms of any kind she is to be returned immediately to the ER for recheck.  The family is to contact their primary care doctor Monday morning and arrange office recheck during the week    FINAL DIAGNOSIS  1.  Subacute pyelonephritis  2.  Constipation       Electronically signed by: Hunter Mueller M.D., 3/15/2024 4:39 PM

## 2024-03-16 NOTE — ED NOTES
Discharged home with daughter. Pt understands to continue abx as prescribed. Follow up with pcp re high blood pressure . Understands use of otc medications for constipation.

## 2024-04-22 ENCOUNTER — OFFICE VISIT (OUTPATIENT)
Dept: MEDICAL GROUP | Facility: PHYSICIAN GROUP | Age: 74
End: 2024-04-22
Payer: COMMERCIAL

## 2024-04-22 VITALS
HEIGHT: 63 IN | WEIGHT: 186.6 LBS | HEART RATE: 79 BPM | SYSTOLIC BLOOD PRESSURE: 126 MMHG | RESPIRATION RATE: 16 BRPM | BODY MASS INDEX: 33.06 KG/M2 | OXYGEN SATURATION: 93 % | DIASTOLIC BLOOD PRESSURE: 70 MMHG | TEMPERATURE: 97.3 F

## 2024-04-22 DIAGNOSIS — E78.2 MIXED HYPERLIPIDEMIA: ICD-10-CM

## 2024-04-22 DIAGNOSIS — E03.9 ACQUIRED HYPOTHYROIDISM: ICD-10-CM

## 2024-04-22 DIAGNOSIS — E74.39 GLUCOSE INTOLERANCE: ICD-10-CM

## 2024-04-22 PROCEDURE — 99214 OFFICE O/P EST MOD 30 MIN: CPT | Performed by: FAMILY MEDICINE

## 2024-04-22 PROCEDURE — 3074F SYST BP LT 130 MM HG: CPT | Performed by: FAMILY MEDICINE

## 2024-04-22 PROCEDURE — 3078F DIAST BP <80 MM HG: CPT | Performed by: FAMILY MEDICINE

## 2024-04-22 RX ORDER — CEFDINIR 300 MG/1
CAPSULE ORAL
COMMUNITY
Start: 2024-03-13

## 2024-04-22 RX ORDER — LEVOTHYROXINE SODIUM 0.05 MG/1
TABLET ORAL
Qty: 90 TABLET | Refills: 3 | Status: SHIPPED | OUTPATIENT
Start: 2024-04-22

## 2024-04-22 RX ORDER — ONDANSETRON 8 MG/1
8 TABLET, ORALLY DISINTEGRATING ORAL
COMMUNITY
Start: 2024-03-12

## 2024-04-22 RX ORDER — LOSARTAN POTASSIUM 50 MG/1
50 TABLET ORAL DAILY
COMMUNITY
End: 2024-04-22 | Stop reason: SDUPTHER

## 2024-04-22 RX ORDER — TRAMADOL HYDROCHLORIDE 50 MG/1
50 TABLET ORAL EVERY 6 HOURS PRN
COMMUNITY
Start: 2024-03-13

## 2024-04-22 RX ORDER — LOSARTAN POTASSIUM 50 MG/1
50 TABLET ORAL DAILY
Qty: 90 TABLET | Refills: 3 | Status: SHIPPED | OUTPATIENT
Start: 2024-04-22

## 2024-04-22 RX ORDER — DICYCLOMINE HCL 20 MG
TABLET ORAL
COMMUNITY
Start: 2024-03-07

## 2024-04-22 ASSESSMENT — ENCOUNTER SYMPTOMS
NAUSEA: 0
MYALGIAS: 0
MUSCULOSKELETAL NEGATIVE: 1
BLURRED VISION: 0
NEUROLOGICAL NEGATIVE: 1
GASTROINTESTINAL NEGATIVE: 1
HEARTBURN: 0
CONSTITUTIONAL NEGATIVE: 1
CHILLS: 0
BRUISES/BLEEDS EASILY: 0
DEPRESSION: 0
TINGLING: 0
HEMOPTYSIS: 0
DIZZINESS: 0
PSYCHIATRIC NEGATIVE: 1
FEVER: 0
RESPIRATORY NEGATIVE: 1
CARDIOVASCULAR NEGATIVE: 1
PALPITATIONS: 0
HEADACHES: 0
COUGH: 0
DOUBLE VISION: 0
EYES NEGATIVE: 1

## 2024-04-22 ASSESSMENT — FIBROSIS 4 INDEX: FIB4 SCORE: 1.75

## 2024-04-22 ASSESSMENT — PATIENT HEALTH QUESTIONNAIRE - PHQ9: CLINICAL INTERPRETATION OF PHQ2 SCORE: 0
